# Patient Record
Sex: FEMALE | URBAN - METROPOLITAN AREA
[De-identification: names, ages, dates, MRNs, and addresses within clinical notes are randomized per-mention and may not be internally consistent; named-entity substitution may affect disease eponyms.]

---

## 2023-05-09 ENCOUNTER — OFFICE VISIT (OUTPATIENT)
Dept: URBAN - METROPOLITAN AREA CLINIC 6 | Facility: CLINIC | Age: 33
End: 2023-05-09
Payer: COMMERCIAL

## 2023-05-09 DIAGNOSIS — G36.0 NEUROMYELITIS OPTICA [DEVIC]: Primary | ICD-10-CM

## 2023-05-09 PROCEDURE — 92004 COMPRE OPH EXAM NEW PT 1/>: CPT | Performed by: OPTOMETRIST

## 2023-05-09 ASSESSMENT — INTRAOCULAR PRESSURE
OD: 18
OS: 17

## 2023-05-09 NOTE — IMPRESSION/PLAN
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 Plan: Neuromyelitis optica -No pallor or edema of the optic nerve Dilated exam today was WNL. Baseline RNFL ordered and taken today. Under the care of neurologist and has an appointment with them on 5/17/23. CCrx with them. Discussed returning immediately for any sudden changes to vision or for any pain or discomfort.